# Patient Record
Sex: FEMALE | Race: WHITE | Employment: FULL TIME | URBAN - METROPOLITAN AREA
[De-identification: names, ages, dates, MRNs, and addresses within clinical notes are randomized per-mention and may not be internally consistent; named-entity substitution may affect disease eponyms.]

---

## 2017-12-28 ENCOUNTER — HOSPITAL ENCOUNTER (OUTPATIENT)
Age: 52
Discharge: HOME | End: 2017-12-28
Payer: OTHER GOVERNMENT

## 2017-12-28 ENCOUNTER — OFFICE VISIT (OUTPATIENT)
Dept: FAMILY MEDICINE CLINIC | Age: 52
End: 2017-12-28
Payer: OTHER GOVERNMENT

## 2017-12-28 ENCOUNTER — HOSPITAL ENCOUNTER (OUTPATIENT)
Dept: GENERAL RADIOLOGY | Age: 52
Discharge: HOME | End: 2017-12-28
Payer: OTHER GOVERNMENT

## 2017-12-28 VITALS
RESPIRATION RATE: 16 BRPM | DIASTOLIC BLOOD PRESSURE: 88 MMHG | BODY MASS INDEX: 43.52 KG/M2 | WEIGHT: 282 LBS | TEMPERATURE: 97.8 F | HEART RATE: 73 BPM | SYSTOLIC BLOOD PRESSURE: 160 MMHG

## 2017-12-28 DIAGNOSIS — R03.0 ELEVATED BLOOD PRESSURE READING: ICD-10-CM

## 2017-12-28 DIAGNOSIS — Z12.31 VISIT FOR SCREENING MAMMOGRAM: ICD-10-CM

## 2017-12-28 DIAGNOSIS — M25.512 ACUTE PAIN OF LEFT SHOULDER: ICD-10-CM

## 2017-12-28 DIAGNOSIS — M25.512 ACUTE PAIN OF LEFT SHOULDER: Primary | ICD-10-CM

## 2017-12-28 LAB
EKG ATRIAL RATE: 66 BPM
EKG P AXIS: 59 DEGREES
EKG P-R INTERVAL: 174 MS
EKG Q-T INTERVAL: 454 MS
EKG QRS DURATION: 136 MS
EKG QTC CALCULATION (BAZETT): 475 MS
EKG R AXIS: 43 DEGREES
EKG T AXIS: 3 DEGREES
EKG VENTRICULAR RATE: 66 BPM

## 2017-12-28 PROCEDURE — 99213 OFFICE O/P EST LOW 20 MIN: CPT | Performed by: NURSE PRACTITIONER

## 2017-12-28 PROCEDURE — 3014F SCREEN MAMMO DOC REV: CPT | Performed by: NURSE PRACTITIONER

## 2017-12-28 PROCEDURE — 4004F PT TOBACCO SCREEN RCVD TLK: CPT | Performed by: NURSE PRACTITIONER

## 2017-12-28 PROCEDURE — 3017F COLORECTAL CA SCREEN DOC REV: CPT | Performed by: NURSE PRACTITIONER

## 2017-12-28 PROCEDURE — G8484 FLU IMMUNIZE NO ADMIN: HCPCS | Performed by: NURSE PRACTITIONER

## 2017-12-28 PROCEDURE — G8417 CALC BMI ABV UP PARAM F/U: HCPCS | Performed by: NURSE PRACTITIONER

## 2017-12-28 PROCEDURE — G8427 DOCREV CUR MEDS BY ELIG CLIN: HCPCS | Performed by: NURSE PRACTITIONER

## 2017-12-28 PROCEDURE — 93005 ELECTROCARDIOGRAM TRACING: CPT

## 2017-12-28 PROCEDURE — 73030 X-RAY EXAM OF SHOULDER: CPT

## 2017-12-28 RX ORDER — METAXALONE 800 MG/1
800 TABLET ORAL 3 TIMES DAILY
Qty: 30 TABLET | Refills: 0 | Status: SHIPPED | OUTPATIENT
Start: 2017-12-28 | End: 2018-01-08 | Stop reason: SDUPTHER

## 2017-12-28 RX ORDER — PREDNISONE 20 MG/1
40 TABLET ORAL DAILY
Qty: 10 TABLET | Refills: 0 | Status: SHIPPED | OUTPATIENT
Start: 2017-12-28 | End: 2018-01-02

## 2017-12-28 RX ORDER — HYDROCODONE BITARTRATE AND ACETAMINOPHEN 5; 325 MG/1; MG/1
1 TABLET ORAL EVERY 6 HOURS PRN
Qty: 10 TABLET | Refills: 0 | Status: SHIPPED | OUTPATIENT
Start: 2017-12-28 | End: 2018-01-04

## 2017-12-28 ASSESSMENT — ENCOUNTER SYMPTOMS
SHORTNESS OF BREATH: 0
NAUSEA: 0
WHEEZING: 0
VOMITING: 0
COLOR CHANGE: 0

## 2017-12-28 NOTE — PROGRESS NOTES
[address]  Kimberli Covarrubias [address]  Dept: [phone]  Dept Fax: [phone]    Lorenzo Khan is a 45 y.o. female who presents today for her medical conditions/complaints as noted below. Lorenzo Khan is c/o of Shoulder Pain (Lt shoulder pain x 2 weeks pt states pain rates at 8/9)        HPI:     Shoulder Pain    The pain is present in the left shoulder. This is a new problem. Episode onset: 2 weeks. There has been no history of extremity trauma. The problem occurs constantly. The problem has been gradually worsening. The quality of the pain is described as aching and sharp. The pain is at a severity of 8/10. The pain is moderate. Associated symptoms include a limited range of motion. Pertinent negatives include no fever, inability to bear weight, itching, joint locking, joint swelling, numbness, stiffness or tingling. The symptoms are aggravated by activity and lying down (certain positions when ,moves arm fowards). She has tried NSAIDS for the symptoms. The treatment provided mild relief. Family history does not include gout or rheumatoid arthritis. There is no history of gout. Her blood pressure is elevated today though she is in significant pain. She states she had her blood pressure checked a few months ago is within normal range. Denies any history of hypertension. Denies chest pain or pressure, shortness of breath, headaches vision changes.   Does smoke tobacco.  She's had a history of palpitations the past.  Past Medical History:   Diagnosis Date    Abnormal menses     Varicose veins       Past Surgical History:   Procedure Laterality Date    APPENDECTOMY  1967    BREAST LUMPECTOMY Left 1977   [facility]    HYSTERECTOMY  7-20-15    Dr. Christy Kramer, [facility]    TONSILLECTOMY AND ADENOIDECTOMY Bilateral 1969       Family History   Problem Relation Age of Onset    High Blood Pressure Mother    [facility] Neuropathy Mother     Breast Cancer Mother     Heart Disease Brother        Social History   Substance Use Topics    Smoking status: Current Every Day Smoker     Packs/day: 0.50     Years: 30.00     Types: Cigarettes    Smokeless tobacco: Never Used    Alcohol use No      Current Outpatient Prescriptions   Medication Sig Dispense Refill    metaxalone (SKELAXIN) 800 MG tablet Take 1 tablet by mouth 3 times daily for 10 days 30 tablet 0    HYDROcodone-acetaminophen (NORCO) 5-325 MG per tablet Take 1 tablet by mouth every 6 hours as needed for Pain . 10 tablet 0    predniSONE (DELTASONE) 20 MG tablet Take 2 tablets by mouth daily for 5 days 10 tablet 0    docusate sodium (COLACE) 100 MG capsule   5    ibuprofen (ADVIL;MOTRIN) 200 MG tablet Take 200 mg by mouth every 6 hours as needed for Pain      acetaminophen (TYLENOL) 325 MG tablet Take 650 mg by mouth every 6 hours as needed for Pain      Multiple Vitamins-Minerals (MULTIVITAMIN PO) Take by mouth      ferrous sulfate 325 (65 FE) MG tablet Take 325 mg by mouth daily (with breakfast)      Omega-3 Fatty Acids (FISH OIL) 1200 MG CAPS Take 1 tablet by mouth daily      calcium citrate-vitamin D (CITRICAL + D) 315-250 MG-UNIT TABS Take by mouth       No current facility-administered medications for this visit. Allergies   Allergen Reactions    Pcn [Penicillins] Other (See Comments)     childhood       Health Maintenance   Topic Date Due    Hepatitis C screen  1965    HIV screen  10/29/1978    DTaP/Tdap/Td vaccine (1 - Tdap) 10/29/1982    Pneumococcal med risk (1 of 1 - PPSV23) 10/29/1982    Cervical cancer screen  10/29/1984    Colon cancer screen colonoscopy  10/29/2013    Breast cancer screen  06/17/2016    Flu vaccine (1) 09/01/2017    Lipid screen  07/01/2020       Subjective:      Review of Systems   Constitutional: Negative for chills, fatigue, fever and unexpected weight change. Eyes: Negative for visual disturbance. HYDROcodone-acetaminophen (NORCO) 5-325 MG per tablet    predniSONE (DELTASONE) 20 MG tablet    EKG 12 Lead    [facility] - [place]   2. Elevated blood pressure reading  EKG 12 Lead   3. Visit for screening mammogram  ELSA Screening Bilateral       Plan:      [facility] presents today for an acute visit in regards to shoulder pain is mainly located posterior worse with certain movements of her shoulder or when she lays down. She has had no evaluation thus far. 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 Shoulder x-ray has been ordered. Short-term course of oral steroids informed take this with food and no other NSAIDs muscle relaxant and small supply of Norco provided. Informed take all medications as directed informed of the adverse effects associated with these medications. Have encouraged physical therapy 2 to 3 times a week for the next 4-6 weeks. Also provided her work note for the next 3 days. If her symptoms. Persist or worsen with suggest referral to orthopedics    Her blood pressure was elevated today she's isn't manic with this she is in a lot of discomfort suspect related to pain will check EKG though given shoulder pain and hypertension. She has not been in for years in regards to her chronic issues of asked her to follow-up in 4-6 weeks for her shoulder pain at that time also for a regular office visit checkup. She was provided fit test for colon cancer screening and mammogram for breast cancer screening while in office. Return in about 4 weeks (around 1/25/2018) for chronic issues and shoudler pain.     Orders Placed This Encounter   Procedures    XR SHOULDER LEFT (MIN 2 VIEWS)     Standing Status:   Future     Standing Expiration Date:   12/28/2018     Order Specific Question:   Reason for exam:     Answer:   pain    ELSA Screening Bilateral     Standing Status:   Future     Standing Expiration Date: 1/17/2019     Order Specific Question:   Reason for exam:     Answer:   screening    [facility] [place]     Referral Priority:   Routine     Referral Type:   Eval and Treat     Referral Reason:   Specialty Services Required     Requested Specialty:   Physical Therapy     Number of Visits Requested:   1    EKG 12 Lead     Order Specific Question:   Reason for Exam?     Answer:   Hypertension     Comments:   left shoulder pain     Orders Placed This Encounter   Medications    metaxalone (SKELAXIN) 800 MG tablet     Sig: Take 1 tablet by mouth 3 times daily for 10 days     Dispense:  30 tablet     Refill:  0    HYDROcodone-acetaminophen (NORCO) 5-325 MG per tablet     Sig: Take 1 tablet by mouth every 6 hours as needed for Pain . Dispense:  10 tablet     Refill:  0    predniSONE (DELTASONE) 20 MG tablet     Sig: Take 2 tablets by mouth daily for 5 days     Dispense:  10 tablet     Refill:  0       Patient given educational materials - see patient instructions. Discussed use, benefit, and side effects of prescribed medications. All patient questions answered. Pt voiced understanding. Reviewed health maintenance. Instructed to continue current medications, diet and exercise. Patient agreed with treatment plan. Follow up as directed. Electronically signed by Yumiko Portillo CNP on 12/28/2017 at 1:33 PM    Of the 15 minute duration appointment visit, Yumiko Portillo CNP spent at least 50% of the face-to-face time in counseling, explanation of diagnosis, planning of further management, and answering all questions.

## 2017-12-28 NOTE — LETTER
[facility]  [address]  Phone: [phone]  Fax: [phone]    Sebastian Schwartz CNP        December 28, 2017     Patient: Caty Beck   YOB: 1965   Date of Visit: 12/28/2017       To Whom it May Concern:    Pedro Pastor was seen in my clinic on 12/28/2017. She may return to work on 1/2/2018. If you have any questions or concerns, please don't hesitate to call.     Sincerely,         Sebastian Schwartz CNP

## 2017-12-28 NOTE — PATIENT INSTRUCTIONS
be used for purposes not listed in this medication guide. What should I discuss with my healthcare provider before taking metaxalone? You should not use metaxalone if you are allergic to it, or if you have:  · anemia (low red blood cells);  · severe kidney disease; or  · severe liver disease. To make sure metaxalone is safe for you, tell your doctor if you have liver or kidney disease. It is not known whether this medicine will harm an unborn baby. Tell your doctor if you are pregnant or plan to become pregnant. It is not known whether metaxalone passes into breast milk or if it could harm a nursing baby. You should not breast-feed while using this medicine. Metaxalone is not approved for use by anyone younger than 15years old. How should I take metaxalone? Follow all directions on your prescription label. Do not take this medicine in larger or smaller amounts or for longer than recommended. This medicine can cause unusual results with certain medical tests. Tell any doctor who treats you that you are using metaxalone. Metaxalone is only part of a complete treatment program that may also include rest, physical therapy, or other pain relief measures. Follow your doctor's instructions. Store at room temperature away from moisture and heat. What happens if I miss a dose? Take the missed dose as soon as you remember. Skip the missed dose if it is almost time for your next scheduled dose. Do not take extra medicine to make up the missed dose. What happens if I overdose? Seek emergency medical attention or call the Poison Help line at 1-859.571.2725. An overdose of metaxalone can be fatal.  What should I avoid while taking metaxalone? Do not drink alcohol. Dangerous side effects or death can occur when alcohol is combined with metaxalone. Check your food and medicine labels to be sure these products do not contain alcohol. This medicine may impair your thinking or reactions.  Be careful if you drive or do called serotonin syndrome. Be sure your doctor knows if you also take medicine for depression, mental illness, Parkinson's disease, migraine headaches, serious infections, or prevention of nausea and vomiting. Ask your doctor before making any changes in how or when you take your medications. To make sure this medicine is safe for you, tell your doctor if you have:  · liver disease, cirrhosis, or if you drink more than 3 alcoholic beverages per day;  · a history of alcoholism or drug addiction;  · diarrhea, inflammatory bowel disease, bowel obstruction, severe constipation;  · kidney disease;  · low blood pressure, or if you are dehydrated;  · a history of head injury, brain tumor, or stroke;  · asthma, COPD, sleep apnea, or other breathing disorders; or  · if you use a sedative like Valium (diazepam, alprazolam, lorazepam, Ativan, Klonopin, Restoril, Tranxene, Versed, Xanax, and others). This medicine is more likely to cause breathing problems in older adults and people who are severely ill, malnourished, or otherwise debilitated. If you use narcotic medicine while you are pregnant, your baby could become dependent on the drug. This can cause life-threatening withdrawal symptoms in the baby after it is born. Babies born dependent on habit-forming medicine may need medical treatment for several weeks. Tell your doctor if you are pregnant or plan to become pregnant. Acetaminophen and hydrocodone can pass into breast milk and may harm a nursing baby. You should not breast-feed while using this medicine. How should I take acetaminophen and hydrocodone? Follow all directions on your prescription label. Never take this medicine in larger amounts, or for longer than prescribed. An overdose can damage your liver or cause death. Tell your doctor if the medicine seems to stop working as well in relieving your pain. Hydrocodone may be habit-forming, even at regular doses.  Never share this medicine with another person, especially someone with a history of drug abuse or addiction. MISUSE OF NARCOTIC MEDICINE CAN CAUSE ADDICTION, OVERDOSE, OR DEATH, especially in a child or other person using the medicine without a prescription. Selling or giving away acetaminophen and hydrocodone is against the law. Measure liquid medicine with the dosing syringe provided, or with a special dose-measuring spoon or medicine cup. If you do not have a dose-measuring device, ask your pharmacist for one. If you need surgery or medical tests, tell the doctor ahead of time that you are using this medicine. You may need to stop using the medicine for a short time. Do not stop using this medicine suddenly after long-term use, or you could have unpleasant withdrawal symptoms. Ask your doctor how to safely stop using acetaminophen and hydrocodone. Store at room temperature away from moisture and heat. Keep track of the amount of medicine used from each new bottle. Hydrocodone is a drug of abuse and you should be aware if anyone is using your medicine improperly or without a prescription. Always check your bottle to make sure you have received the correct pills (same brand and type) of medicine prescribed by your doctor. What happens if I miss a dose? Since acetaminophen and hydrocodone is taken as needed, you may not be on a dosing schedule. If you are taking the medication regularly, take the missed dose as soon as you remember. Skip the missed dose if it is almost time for your next scheduled dose. Do not use extra medicine to make up the missed dose. What happens if I overdose? Seek emergency medical attention or call the Poison Help line at 1-577.836.5114. An overdose of acetaminophen and hydrocodone can be fatal.   The first signs of an acetaminophen overdose include loss of appetite, nausea, vomiting, stomach pain, sweating, and confusion or weakness.  Later symptoms may include pain in your upper stomach, dark urine, and yellowing of arms.  ¨ Dizziness or lightheadedness. ¨ A fast or uneven pulse. After calling 911, chew 1 adult-strength aspirin. Wait for an ambulance. Do not try to drive yourself. ? · Your arm or hand is cool or pale or changes color. ?Call your doctor now or seek immediate medical care if:  ? · You have signs of infection, such as:  ¨ Increased pain, swelling, warmth, or redness in your shoulder. ¨ Red streaks leading from a place on your shoulder. ¨ Pus draining from an area of your shoulder. ¨ Swollen lymph nodes in your neck, armpits, or groin. ¨ A fever. ? Watch closely for changes in your health, and be sure to contact your doctor if:  ? · You cannot use your shoulder. ? · Your shoulder does not get better as expected. Where can you learn more? Go to https://Muzookape"[employer].ugichem. org and sign in to your [employer] account. Enter B839 in the Advizzer box to learn more about \"Shoulder Pain: Care Instructions. \"     If you do not have an account, please click on the \"Sign Up Now\" link. Current as of: March 21, 2017  Content Version: 11.4  © 2475-5625 [employer], [employer]. Care instructions adapted under license by [facility] ([facility]). If you have questions about a medical condition or this instruction, always ask your healthcare professional. [address] any warranty or liability for your use of this information.

## 2017-12-29 ENCOUNTER — HOSPITAL ENCOUNTER (OUTPATIENT)
Dept: PHYSICAL THERAPY | Facility: CLINIC | Age: 52
Setting detail: THERAPIES SERIES
Discharge: HOME | End: 2017-12-29
Payer: OTHER GOVERNMENT

## 2017-12-29 PROCEDURE — 97110 THERAPEUTIC EXERCISES: CPT

## 2017-12-29 PROCEDURE — 97161 PT EVAL LOW COMPLEX 20 MIN: CPT

## 2017-12-29 PROCEDURE — 97140 MANUAL THERAPY 1/> REGIONS: CPT

## 2017-12-29 NOTE — CONSULTS
worst 9-10/0  Pain altered Tx:  [] Yes  [] No  Action:  Symptoms:  [] Improving [] Worsening [x] Same  Better:  [] AM    [] PM    [] Sit    [] Rise/Sit    []Stand    [] Walk    [] Lying    [] Other:  Worse: [] AM    [] PM    [] Sit    [] Rise/Sit    []Stand    [] Walk    [] Lying    [] Bend                             [] Valsalva    [] Other:  Sleep: [] OK    [x] Disturbed    Objective:     ROM  °A/P STRENGTH    Left Right Left Right   Shoulder Flex wfl wfl 4- 4-   Ext wnl wnl 5 5   ABD wfl wfl 4- 4-   ER @ 0 45 90 wnl wnl 5 5   IR wnl wnl 5 5   Elbow Flex. 5    Elbow Ext.   4 4+   Wrist Flex. 5    Wrist Ext.   5    Rad. Dev.   5    Ulnar Dev.   5           C spine: tight with flexion, ext, side bending, rotation wnl      OBSERVATION No Deficit Deficit Not Tested Comments   Forward Head [] [x] []    Rounded Shoulders [] [x] []    Kyphosis [] [x] []    Scap Height/Position [] [x] []    Winging [] [] []    SH Rhythm [] [] []    Comments:    Assessment:presents with L pec minor, MT/rhomboid and LT spasm  Problems:    [x] ? Pain:  [] ? ROM:  [x] ? Strength: with ulices shoulders  [x] ? Function:as she is off work at this time. [] Other:    STG: (to be met in 6 treatments)  1. ? Pain: <3/10  2. ? ROM: to wnl with shoulder flexion and abd  3. ? Strength: 5/5 with L UE movements  4. ? Function: Able to return to work next week to normal duty  5. Independent with Home Exercise Programs    LTG: (to be met in 12 treatments)  1. 0-1/10 pain in L shoulder/scap/neck region  2. No pain with standing and leaning forward.                  Patient goals:\"to feel better\"    Rehab Potential:  [x] Good  [] Fair  [] Poor   Suggested Professional Referral:  [x] No  [] Yes:  Barriers to Goal Achievement[de-identified]  [x] No  [] Yes:  Domestic Concerns:  [x] No  [] Yes:    Pt. Education:  [x] Plans/Goals, Risks/Benefits discussed  [] Home exercise program  Method of Education: [x] Verbal  [] Demo  [] Written  Comprehension of Education:  [x]

## 2018-01-03 ENCOUNTER — HOSPITAL ENCOUNTER (OUTPATIENT)
Dept: PHYSICAL THERAPY | Facility: CLINIC | Age: 53
Setting detail: THERAPIES SERIES
Discharge: HOME | End: 2018-01-03
Payer: OTHER GOVERNMENT

## 2018-01-03 PROCEDURE — 97110 THERAPEUTIC EXERCISES: CPT

## 2018-01-03 NOTE — FLOWSHEET NOTE
[] [facility]       Outpatient Physical        Therapy       [address]       Phone: [phone]       Fax: [phone] [] [facility] Promotion at [address] [facility]       Phone: [phone]       Fax: [phone] [] [place]. [address] [place] [address] Health Promotion  [address] [place] [address]   Phone: [phone]   Fax:  [phone]     Physical Therapy Daily Treatment Note    Date:  1/3/2018  Patient Name:  Lea Leggett    :  1965  MRN: [id number]  Physician: Laura Hargrove CNP    Insurance: Kerry Mood (60 visits/yr)  Medical Diagnosis: Acute pain of L shoulderRehab Codes: E72.915,   Onset Date: 17                         Next Dr. Talavera Rowland / total visits:   Cancels/No Shows: 0    Subjective:    Pain:  [x] Yes  [] No Location: L upper trap/cervical   Pain Rating: (0-10 scale) 6/7/10  Pain altered Tx:  [] No  [] Yes  Action:  Comments: Pt mentioned that after last session she had cramping and tingling down her L UE. Pt mentioned more painful with night time. Objective:  Modalities:   Precautions:  Exercises:  Exercise Reps/ Time Weight/ Level Comments   UBE 10'  Warm-up   Supine          1/2 noodle 5'   Length of spine   1/2 noodle  5'   Laterally across T4   Seated          Upper trap stretch 10x10\"       Mid thoracic stretch 10x10\"   3 way 45°,90°,    Posterior capsule stretch 10x10\"     Shrug to retro rotation 15x     Sidelying         Ext rot 15x       Standing         Doorway stretch 10x10\"           Other:    Specific Instructions for next treatment:    Treatment Charges: Mins Units   []  Modalities     [x]  Ther Exercise 45 3   []  Manual Therapy     []  Ther Activities     []  Aquatics     []  Vasocompression     []  Other     Total Treatment time          Assessment: [x] Progressing toward goals. [] No change.      [x] Other: Added more stretches to pt program, educated pt on stretches and body mechanics that

## 2018-01-05 ENCOUNTER — HOSPITAL ENCOUNTER (OUTPATIENT)
Dept: PHYSICAL THERAPY | Facility: CLINIC | Age: 53
Setting detail: THERAPIES SERIES
Discharge: HOME | End: 2018-01-05
Payer: OTHER GOVERNMENT

## 2018-01-05 PROCEDURE — 97110 THERAPEUTIC EXERCISES: CPT

## 2018-01-05 NOTE — FLOWSHEET NOTE
[] [facility]       Outpatient Physical        Therapy       [address]       Phone: [phone]       Fax: [phone] [] [facility] Promotion at [address]       Phone: [phone]       Fax: [phone] [x] [place]. [facility] Health Promotion  [address] [place] [address]   Phone: [phone]   Fax:  [phone]     Physical Therapy Daily Treatment Note    Date:  2018  Patient Name:  Iván Clemens    :  1965  MRN: [id number]  Physician: Regine Kelsey CNP  Insurance: Latricia Anguiano (61 visits/yr)  Medical Diagnosis: Acute pain of L shoulderRehab Codes: R33.586,   Onset Date: 17                          Next Dr. Shamar Gaytan / total visits: 312   Cancels/No Shows: 0    Subjective:    Pain:  [x] Yes  [] No Location: L upper trap/cervical   Pain Rating: (0-10 scale) 5-6/10  Pain altered Tx:  [x] No  [] Yes  Action:  Comments: Pt requests no DI as it left her L arm very painful and with limited function after init eval.  She states that she was short on staff at work and had to work more. She declined some activities at work due to fear of reinjury. It was explained to her that she can modify her technique to safely perform the task rather than declining or performing it with poor posture. She consistently demonstrated scapular elevation with simulation of holding mail in L hand or lifting upward. She was advised not to shrug her shoulder to lift.      Objective:  Modalities: none  Precautions: standard  Exercises:  Exercise Reps/ Time Weight/ Level Comments   UBE 10'  Warm-up   Supine          1/2 noodle 5'  x Length of spine   1/2 noodle  3'  x Laterally across T4   Prone      I-T-Y 15x ea 1 lb     row 15 3 lbs    Seated          Upper trap stretch 3x30\"  x Gentle    Shrug to retro rotation 20x x    Sidelying         Ext rot 2x20 1 lb     Standing         Doorway stretch 3x30\" x    TB ER 3x10 red

## 2018-01-08 DIAGNOSIS — M25.512 ACUTE PAIN OF LEFT SHOULDER: ICD-10-CM

## 2018-01-08 RX ORDER — METAXALONE 800 MG/1
800 TABLET ORAL 3 TIMES DAILY
Qty: 30 TABLET | Refills: 0 | Status: SHIPPED | OUTPATIENT
Start: 2018-01-08 | End: 2018-01-17 | Stop reason: SDUPTHER

## 2018-01-08 NOTE — TELEPHONE ENCOUNTER
From: Akash Jones  Sent: 1/7/2018 3:04 PM EST  Subject: Medication Renewal Request    Leno Chilel.  Ana Laura Cooper would like a refill of the following medications:  metaxalone (SKELAXIN) 800 MG tablet Radha Bejarano CNP]    Preferred pharmacy: [facility] [address] [address] - P [phone] - F [phone]    Comment:  Still hurting this seems to take the edge off some

## 2018-01-10 ENCOUNTER — HOSPITAL ENCOUNTER (OUTPATIENT)
Dept: PHYSICAL THERAPY | Facility: CLINIC | Age: 53
Setting detail: THERAPIES SERIES
Discharge: HOME | End: 2018-01-10
Payer: OTHER GOVERNMENT

## 2018-01-10 PROCEDURE — 97110 THERAPEUTIC EXERCISES: CPT

## 2018-01-12 ENCOUNTER — HOSPITAL ENCOUNTER (OUTPATIENT)
Dept: PHYSICAL THERAPY | Facility: CLINIC | Age: 53
Setting detail: THERAPIES SERIES
Discharge: HOME | End: 2018-01-12
Payer: OTHER GOVERNMENT

## 2018-01-12 NOTE — FLOWSHEET NOTE
[] [facility]        Outpatient Physical                Therapy       [address]       Phone: [phone]       Fax: [phone] [] [facility] at [address] [facility]       Phone: [phone]       Fax: [phone] [x] [place].  [address] [place] [address]      Phone: [phone]      Fax:  [phone]     Physical Therapy Cancel/No Show note    Date: 2018  Patient: Constantine Bryson  : 1965  MRN: [id number]    Cancels/No Shows to date:   For today's appointment patient:  [x]  Cancelled  []  Rescheduled appointment  []  No-show     Reason given by patient:  []  Patient ill  []  Conflicting appointment  []  No transportation    []  Conflict with work  []  No reason given  [x]  Weather related  []  Other:      Comments:      [x]  Next appointment was confirmed    Electronically signed by: Henrik Zhou

## 2018-01-15 ENCOUNTER — APPOINTMENT (OUTPATIENT)
Dept: PHYSICAL THERAPY | Facility: CLINIC | Age: 53
End: 2018-01-15
Payer: OTHER GOVERNMENT

## 2018-01-17 ENCOUNTER — HOSPITAL ENCOUNTER (OUTPATIENT)
Dept: PHYSICAL THERAPY | Facility: CLINIC | Age: 53
Setting detail: THERAPIES SERIES
Discharge: HOME | End: 2018-01-17
Payer: OTHER GOVERNMENT

## 2018-01-17 ENCOUNTER — HOSPITAL ENCOUNTER (OUTPATIENT)
Dept: MAMMOGRAPHY | Age: 53
Discharge: HOME | End: 2018-01-17
Payer: OTHER GOVERNMENT

## 2018-01-17 ENCOUNTER — TELEPHONE (OUTPATIENT)
Dept: FAMILY MEDICINE CLINIC | Age: 53
End: 2018-01-17

## 2018-01-17 DIAGNOSIS — Z12.31 VISIT FOR SCREENING MAMMOGRAM: ICD-10-CM

## 2018-01-17 DIAGNOSIS — M25.512 ACUTE PAIN OF LEFT SHOULDER: ICD-10-CM

## 2018-01-17 PROCEDURE — 77067 SCR MAMMO BI INCL CAD: CPT

## 2018-01-17 PROCEDURE — 97110 THERAPEUTIC EXERCISES: CPT

## 2018-01-17 RX ORDER — METAXALONE 800 MG/1
800 TABLET ORAL 3 TIMES DAILY
Qty: 30 TABLET | Refills: 1 | Status: SHIPPED | OUTPATIENT
Start: 2018-01-17 | End: 2018-01-31 | Stop reason: SDUPTHER

## 2018-01-18 ENCOUNTER — TELEPHONE (OUTPATIENT)
Dept: FAMILY MEDICINE CLINIC | Age: 53
End: 2018-01-18

## 2018-01-19 ENCOUNTER — HOSPITAL ENCOUNTER (OUTPATIENT)
Dept: PHYSICAL THERAPY | Facility: CLINIC | Age: 53
Setting detail: THERAPIES SERIES
Discharge: HOME | End: 2018-01-19
Payer: OTHER GOVERNMENT

## 2018-01-19 PROCEDURE — 97110 THERAPEUTIC EXERCISES: CPT

## 2018-01-24 ENCOUNTER — HOSPITAL ENCOUNTER (OUTPATIENT)
Dept: PHYSICAL THERAPY | Facility: CLINIC | Age: 53
Setting detail: THERAPIES SERIES
Discharge: HOME | End: 2018-01-24
Payer: OTHER GOVERNMENT

## 2018-01-24 PROCEDURE — 97110 THERAPEUTIC EXERCISES: CPT

## 2018-01-26 ENCOUNTER — APPOINTMENT (OUTPATIENT)
Dept: PHYSICAL THERAPY | Facility: CLINIC | Age: 53
End: 2018-01-26
Payer: OTHER GOVERNMENT

## 2018-01-31 ENCOUNTER — OFFICE VISIT (OUTPATIENT)
Dept: FAMILY MEDICINE CLINIC | Age: 53
End: 2018-01-31
Payer: OTHER GOVERNMENT

## 2018-01-31 VITALS
RESPIRATION RATE: 16 BRPM | WEIGHT: 280 LBS | BODY MASS INDEX: 42.44 KG/M2 | HEIGHT: 68 IN | SYSTOLIC BLOOD PRESSURE: 164 MMHG | DIASTOLIC BLOOD PRESSURE: 93 MMHG | HEART RATE: 66 BPM

## 2018-01-31 DIAGNOSIS — M25.512 LEFT SHOULDER PAIN, UNSPECIFIED CHRONICITY: ICD-10-CM

## 2018-01-31 DIAGNOSIS — M54.12 CERVICAL RADICULOPATHY: ICD-10-CM

## 2018-01-31 DIAGNOSIS — Z13.220 SCREENING CHOLESTEROL LEVEL: ICD-10-CM

## 2018-01-31 DIAGNOSIS — M54.2 CERVICAL PAIN (NECK): ICD-10-CM

## 2018-01-31 DIAGNOSIS — M54.12 CERVICAL RADICULOPATHY: Primary | ICD-10-CM

## 2018-01-31 DIAGNOSIS — I10 ESSENTIAL HYPERTENSION: ICD-10-CM

## 2018-01-31 DIAGNOSIS — Z11.59 NEED FOR HEPATITIS C SCREENING TEST: ICD-10-CM

## 2018-01-31 PROCEDURE — 4004F PT TOBACCO SCREEN RCVD TLK: CPT | Performed by: NURSE PRACTITIONER

## 2018-01-31 PROCEDURE — 3017F COLORECTAL CA SCREEN DOC REV: CPT | Performed by: NURSE PRACTITIONER

## 2018-01-31 PROCEDURE — 99214 OFFICE O/P EST MOD 30 MIN: CPT | Performed by: NURSE PRACTITIONER

## 2018-01-31 PROCEDURE — G8484 FLU IMMUNIZE NO ADMIN: HCPCS | Performed by: NURSE PRACTITIONER

## 2018-01-31 PROCEDURE — G8417 CALC BMI ABV UP PARAM F/U: HCPCS | Performed by: NURSE PRACTITIONER

## 2018-01-31 PROCEDURE — G8427 DOCREV CUR MEDS BY ELIG CLIN: HCPCS | Performed by: NURSE PRACTITIONER

## 2018-01-31 PROCEDURE — 3014F SCREEN MAMMO DOC REV: CPT | Performed by: NURSE PRACTITIONER

## 2018-01-31 RX ORDER — LISINOPRIL 5 MG/1
5 TABLET ORAL DAILY
Qty: 30 TABLET | Refills: 3 | Status: SHIPPED | OUTPATIENT
Start: 2018-01-31 | End: 2018-02-01 | Stop reason: SDUPTHER

## 2018-01-31 RX ORDER — METAXALONE 800 MG/1
800 TABLET ORAL 3 TIMES DAILY
Qty: 30 TABLET | Refills: 1 | Status: SHIPPED | OUTPATIENT
Start: 2018-01-31 | End: 2018-01-31 | Stop reason: SDUPTHER

## 2018-01-31 RX ORDER — METAXALONE 800 MG/1
800 TABLET ORAL 3 TIMES DAILY
Qty: 30 TABLET | Refills: 1 | Status: SHIPPED | OUTPATIENT
Start: 2018-01-31 | End: 2018-02-10

## 2018-01-31 RX ORDER — LISINOPRIL 5 MG/1
5 TABLET ORAL DAILY
Qty: 30 TABLET | Refills: 3 | Status: SHIPPED | OUTPATIENT
Start: 2018-01-31 | End: 2018-01-31 | Stop reason: SDUPTHER

## 2018-01-31 RX ORDER — PREDNISONE 20 MG/1
40 TABLET ORAL DAILY
Qty: 10 TABLET | Refills: 0 | Status: SHIPPED | OUTPATIENT
Start: 2018-01-31 | End: 2018-01-31 | Stop reason: SDUPTHER

## 2018-01-31 RX ORDER — PREDNISONE 20 MG/1
40 TABLET ORAL DAILY
Qty: 10 TABLET | Refills: 0 | Status: SHIPPED | OUTPATIENT
Start: 2018-01-31 | End: 2018-02-05

## 2018-01-31 RX ORDER — METAXALONE 800 MG/1
TABLET ORAL
COMMUNITY
Start: 2018-01-28 | End: 2018-01-31 | Stop reason: SDUPTHER

## 2018-01-31 NOTE — TELEPHONE ENCOUNTER
Patient called in and states that prescriptions were sent to wrong pharmacy this morning after her visit. Medication pended along with correct pharmacy, please advise.

## 2018-01-31 NOTE — PATIENT INSTRUCTIONS
This can cause stomach upset or bleeding. · Follow your doctor's instructions for how to stop taking this medicine. You may need to taper it. This means the medicine should be slowly reduced. Do not stop taking the medicine all at once. When should you call for help? Call 911 if:  ? · You vomit blood or what looks like coffee grounds. ?Call your doctor now or seek immediate medical care if:  ? · Your symptoms are getting worse. ? · You are dizzy or lightheaded, or you feel like you may faint. ? · You have new or worse nausea or vomiting. ? · You have stomach pain that is getting worse. ? · Your stools are black. ? Watch closely for changes in your health, and be sure to contact your doctor if:  ? · You do not get better as expected. Where can you learn more? Go to https://[employer]Send[employer]. org and sign in to your [employer] account. Enter D118 in the [employer] box to learn more about \"Oral Corticosteroids: Care Instructions. \"     If you do not have an account, please click on the \"Sign Up Now\" link. Current as of: May 12, 2017  Content Version: 11.5  © 1885-7143 [employer]. Care instructions adapted under license by [facility] ([facility]). If you have questions about a medical condition or this instruction, always ask your healthcare professional. [place] [address] any warranty or liability for your use of this information. Pinched Nerve in the Neck: Care Instructions  Your Care Instructions  A pinched nerve in the neck happens when a vertebra or disc in the upper part of your spine is damaged. This damage can happen because of an injury. Or it can just happen with age. The changes caused by the damage may put pressure on a nearby nerve root, pinching it. This causes symptoms such as sharp pain in your neck, shoulder, arm, hand, or back. You may also have tingling or numbness. Sometimes it makes your arm weaker.  The symptoms are usually worse when you turn your head or strain your neck. For many people, the symptoms get better over time and finally go away. Early treatment usually includes medicines for pain and swelling. Sometimes physical therapy and special exercises may help. Follow-up care is a key part of your treatment and safety. Be sure to make and go to all appointments, and call your doctor if you are having problems. It's also a good idea to know your test results and keep a list of the medicines you take. How can you care for yourself at home? · Be safe with medicines. Read and follow all instructions on the label. ¨ If the doctor gave you a prescription medicine for pain, take it as prescribed. ¨ If you are not taking a prescription pain medicine, ask your doctor if you can take an over-the-counter medicine. · Try using a heating pad on a low or medium setting for 15 to 20 minutes every 2 or 3 hours. Try a warm shower in place of one session with the heating pad. You can also buy single-use heat wraps that last up to 8 hours. · You can also try an ice pack for 10 to 15 minutes every 2 to 3 hours. There isn't strong evidence that either heat or ice will help. But you can try them to see if they help you. · Don't spend too long in one position. Take short breaks to move around and change positions. · Wear a seat belt and shoulder harness when you are in a car. · Sleep with a pillow under your head and neck that keeps your neck straight. · If you were given a neck brace (cervical collar) to limit neck motion, wear it as instructed for as many days as your doctor tells you to. Do not wear it longer than you were told to. Wearing a brace for too long can lead to neck stiffness and can weaken the neck muscles. · Follow your doctor's instructions for gentle neck-stretching exercises. · Do not smoke. Smoking can slow healing of your discs. If you need help quitting, talk to your doctor about stop-smoking programs and medicines.  These enzyme. Lisinopril is used to treat high blood pressure (hypertension) in adults and children who are at least 10years old. Lisinopril is also used to treat congestive heart failure in adults, or to improve survival after a heart attack. Lisinopril may also be used for purposes not listed in this medication guide. What should I discuss with my healthcare provider before taking lisinopril? You should not use this medication if you are allergic to lisinopril or to any other ACE inhibitor, such as benazepril captopril, fosinopril, enalapril, moexipril, perindopril, quinapril, ramipril, or trandolapril. If you have diabetes, do not use lisinopril together with any medication that contains aliskiren ([place], [place], [place]). You may also need to avoid taking lisinopril with aliskiren if you have kidney disease. You should not use lisinopril if you have hereditary angioedema. To make sure lisinopril is safe for you, tell your doctor if you have:  · kidney disease (or if you are on dialysis);  · liver disease;  · diabetes; or  · high levels of potassium in your blood. Do not use if you are pregnant. If you become pregnant, stop taking this medicine and tell your doctor right away. Lisinopril can cause injury or death to the unborn baby if you take the medicine during your second or third trimester. It is not known whether lisinopril passes into breast milk or if it could harm a nursing baby. You should not breast-feed while using this medicine. How should I take lisinopril? Follow all directions on your prescription label. Your doctor may occasionally change your dose to make sure you get the best results. Do not take this medicine in larger or smaller amounts or for longer than recommended. Drink plenty of water each day while you are taking this medicine. Lisinopril can be taken with or without food.   Measure liquid medicine with the dosing syringe provided, or with a special dose-measuring spoon or medicine cup. If you do not have a dose-measuring device, ask your pharmacist for one. Your blood pressure will need to be checked often, and you may need frequent blood tests. Call your doctor if you have ongoing vomiting or diarrhea, or if you are sweating more than usual. You can easily become dehydrated while taking this medicine. This can lead to very low blood pressure, electrolyte disorders, or kidney failure while you are taking lisinopril. If you need surgery, tell the surgeon ahead of time that you are using lisinopril. You may need to stop using the medicine for a short time. If you are being treated for high blood pressure, keep using this medicine even if you feel well. High blood pressure often has no symptoms. You may need to use blood pressure medicine for the rest of your life. Store at room temperature away from moisture and heat. Do not freeze the oral liquid. What happens if I miss a dose? Take the missed dose as soon as you remember. Skip the missed dose if it is almost time for your next scheduled dose. Do not take extra medicine to make up the missed dose. What happens if I overdose? Seek emergency medical attention or call the Poison Help line at 1-618.705.6926. What should I avoid while taking lisinopril? Drinking alcohol can further lower your blood pressure and may increase certain side effects of lisinopril. Avoid becoming overheated or dehydrated during exercise, in hot weather, or by not drinking enough fluids. Lisinopril can decrease sweating and you may be more prone to heat stroke. Do not use salt substitutes or potassium supplements while taking lisinopril, unless your doctor has told you to. Avoid getting up too fast from a sitting or lying position, or you may feel dizzy. Get up slowly and steady yourself to prevent a fall. What are the possible side effects of lisinopril?   Get emergency medical help if you have signs of an allergic reaction: hives; severe stomach been made to ensure that the information provided by [address] is accurate, up-to-date, and complete, but no guarantee is made to that effect. Drug information contained herein may be time sensitive. [facility] information has been compiled for use by healthcare practitioners and consumers in the [place] and therefore [facility] does not warrant that uses outside of the [place] are appropriate, unless specifically indicated otherwise. [facility]'s drug information does not endorse drugs, diagnose patients or recommend therapy. [facility]'s drug information is an informational resource designed to assist licensed healthcare practitioners in caring for their patients and/or to serve consumers viewing this service as a supplement to, and not a substitute for, the expertise, skill, knowledge and judgment of healthcare practitioners. The absence of a warning for a given drug or drug combination in no way should be construed to indicate that the drug or drug combination is safe, effective or appropriate for any given patient. [facility] does not assume any responsibility for any aspect of healthcare administered with the aid of information [facility] provides. The information contained herein is not intended to cover all possible uses, directions, precautions, warnings, drug interactions, allergic reactions, or adverse effects. If you have questions about the drugs you are taking, check with your doctor, nurse or pharmacist.  Copyright 3804-1382 [address]: 13.04. Revision date: 9/19/2016. Care instructions adapted under license by [facility] ([facility]). If you have questions about a medical condition or this instruction, always ask your healthcare professional. [place] [address] any warranty or liability for your use of this information.

## 2018-01-31 NOTE — PROGRESS NOTES
Subjective:  Rizwan is in for continued evaluation and management follow-up in regards to neck and right shoulder pain and elevated blood pressure. Rizwan presents the office on December 28, 2017 with complaints of pain to her left shoulder area. This is located more to the posterior and trapezius region. It had been going on for 2 weeks prior to that she had been trying NSAID at home which have been provided mild relief. She describes her pain as severe as 8 out of 10. At that point she was having difficulty with complete range of motion of her shoulder but denied any fevers chills joint locking swelling numbness or tingling to her extremity. A left shoulder x-ray was ordered, ekg,and physical therapy. EKG was stable. She completed a left shoulder x-ray which is negative. She's been doing physical therapy although she has now developed more pain to her left side of her cervical area and still into trapezius region with pain now reading down her left arm with tingling at times to all of her fingers. She denies frequent headaches or vision changes. Denies specific neck injury. She states pain now seems to worsen when she turns her head to either side especially to right. She states the physical therapist office was more related to possibly a cervical issue now then actual shoulder. Her range of motion her shoulder has returned to normal.  If she did have a course of oral steroids and muscle relaxants and she has now been taking NSAIDs still without much relief. She is right-handed. She denies chest pain or pressure or shortness of breath. Denies bowel/bladder incontinence or saddle anesthesia. Her blood pressure was elevated also at the office visit although she wasn't discomfort at that time she is unsure if it is related more to this. She denies any chest pain or pressure shortness of breath headaches or vision changes. Review of systems per HPI, otherwise negative.     Allergies; medications; past medical, surgical, family, and social history; and problem list reviewed as indicated in this encounter. Objective:  Vitals: Blood pressure (!) 164/93, pulse 66, resp. rate 16, height 5' 7.52\" (1.715 m), weight 280 lb (127 kg), last menstrual period 06/11/2015, not currently breastfeeding. Constitutional: She is oriented to person, place, and time. She appears well-developed and well-nourished and in no acute distress. Cardiovascular: Normal rate and regular rhythm, no murmur, rub, or gallop    Pulmonary/Chest: Effort normal and breath sounds normal. No rales or wheezes. No chest retraction. Musculoskeletal: Neck- full ROM, No step-off no cervical spinal tenderness has left paraspinal tenderness and into left trapezius area. Extremities: no clubbing, cyanosis, or edema, still sensation and pulses intact muscle strength 5/5 bilaterally. Neurological:  CN II - XII grossly intact; no focal neurological deficits  Psychiatric:  Well groomed, well dressed. The patient maintains appropriate eye contact and does not appear to be responding to internal stimuli. No agitation    EXAMINATION:   4 VIEWS OF THE LEFT SHOULDER       12/28/2017 2:14 pm       COMPARISON:   None.       HISTORY:   ORDERING SYSTEM PROVIDED HISTORY: Acute pain of left shoulder   TECHNOLOGIST PROVIDED HISTORY:   Reason for exam:->pain   Ordering Physician Provided Reason for Exam: posterior left upper shoulder   pain for 2 weeks   Acuity: Acute   Type of Exam: Initial       FINDINGS:   Glenohumeral joint is normally aligned.  No evidence of acute fracture or   dislocation.  No abnormal periarticular calcifications.  The AC joint shows   no significant abnormality.       Visualized lung is unremarkable.           Impression   Negative left shoulder with no acute abnormality. Assessment/ Plan / Medical Decision Making  1. Cervical radiculopathy  predniSONE (DELTASONE) 20 MG tablet    MRI Cervical Spine WO Contrast   2. Cervical pain (neck)  predniSONE (DELTASONE) 20 MG tablet    MRI Cervical Spine WO Contrast   3. Left shoulder pain, unspecified chronicity     4. Essential hypertension  lisinopril (PRINIVIL;ZESTRIL) 5 MG tablet    Comprehensive Metabolic Panel   5. Screening cholesterol level  Lipid Panel   6. Need for hepatitis C screening test  Hepatitis C Antibody           Medications, laboratory testing, imaging, consultation, and follow up as documented in this encounter. She initially came in for some left shoulder discomfort although it appears her symptoms are more suggestive cervical radiculopathy in nature. At this point given she's tried physical therapy, NSAIDs and is still not having relief I suggested MRI of her cervical spine. We'll do a repeat of oral steroids no NSAIDs while taking this. Refilled Skelaxin. Discussed possibility of treatment with Neurontin. We'll let her know what the MRI results reveal. Notify us if symptoms worsen. High blood pressure- suspect HTN will start her on lisinopril 5mg daily informed to take as directed, she has BP cuff at home that she will monitor check metabolic panel. Check screening labs complete FIT test.  Refused pneumonia vaccine. States no longer has PAPs d/t total hysterectomy for non cancerous reasoning. Follow up 2 weeks for NV BP check, 1 months depending symptoms and result         Marnette Seip received counseling on the following healthy behaviors: medication adherence    Patient given educational materials on Cervical radiculopathy, prednisone, and lisinopril    Was a self-tracking handout given in paper form or via [employer]? No  If yes, see orders or list here. Discussed use, benefit, and side effects of prescribed medications. Barriers to medication compliance addressed. All patient questions answered. Pt voiced understanding.      This note is created with the assistance of a speech-recognition program.  While intending to generate a document that

## 2018-02-01 DIAGNOSIS — I10 ESSENTIAL HYPERTENSION: ICD-10-CM

## 2018-02-01 RX ORDER — LISINOPRIL 5 MG/1
5 TABLET ORAL DAILY
Qty: 90 TABLET | Refills: 1 | Status: SHIPPED | OUTPATIENT
Start: 2018-02-01

## 2018-02-10 ENCOUNTER — HOSPITAL ENCOUNTER (OUTPATIENT)
Dept: MRI IMAGING | Age: 53
Discharge: HOME | End: 2018-02-12
Payer: OTHER GOVERNMENT

## 2018-02-10 ENCOUNTER — HOSPITAL ENCOUNTER (OUTPATIENT)
Age: 53
Discharge: HOME | End: 2018-02-10
Payer: OTHER GOVERNMENT

## 2018-02-10 DIAGNOSIS — M54.2 CERVICAL PAIN (NECK): ICD-10-CM

## 2018-02-10 DIAGNOSIS — Z11.59 NEED FOR HEPATITIS C SCREENING TEST: ICD-10-CM

## 2018-02-10 DIAGNOSIS — I10 ESSENTIAL HYPERTENSION: ICD-10-CM

## 2018-02-10 DIAGNOSIS — Z13.220 SCREENING CHOLESTEROL LEVEL: ICD-10-CM

## 2018-02-10 DIAGNOSIS — M54.12 CERVICAL RADICULOPATHY: ICD-10-CM

## 2018-02-10 LAB
ALBUMIN SERPL-MCNC: 3.8 G/DL (ref 3.5–5.2)
ALBUMIN/GLOBULIN RATIO: 1.3 (ref 1–2.5)
ALP BLD-CCNC: 82 U/L (ref 35–104)
ALT SERPL-CCNC: 23 U/L (ref 5–33)
ANION GAP SERPL CALCULATED.3IONS-SCNC: 13 MMOL/L (ref 9–17)
AST SERPL-CCNC: 17 U/L
BILIRUB SERPL-MCNC: 0.39 MG/DL (ref 0.3–1.2)
BUN BLDV-MCNC: 24 MG/DL (ref 6–20)
BUN/CREAT BLD: ABNORMAL (ref 9–20)
CALCIUM SERPL-MCNC: 9.1 MG/DL (ref 8.6–10.4)
CHLORIDE BLD-SCNC: 107 MMOL/L (ref 98–107)
CHOLESTEROL/HDL RATIO: 3
CHOLESTEROL: 178 MG/DL
CO2: 28 MMOL/L (ref 20–31)
CREAT SERPL-MCNC: 0.47 MG/DL (ref 0.5–0.9)
GFR AFRICAN AMERICAN: >60 ML/MIN
GFR NON-AFRICAN AMERICAN: >60 ML/MIN
GFR SERPL CREATININE-BSD FRML MDRD: ABNORMAL ML/MIN/{1.73_M2}
GFR SERPL CREATININE-BSD FRML MDRD: ABNORMAL ML/MIN/{1.73_M2}
GLUCOSE BLD-MCNC: 90 MG/DL (ref 70–99)
HDLC SERPL-MCNC: 59 MG/DL
HEPATITIS C ANTIBODY: NONREACTIVE
LDL CHOLESTEROL: 104 MG/DL (ref 0–130)
POTASSIUM SERPL-SCNC: 4.9 MMOL/L (ref 3.7–5.3)
SODIUM BLD-SCNC: 148 MMOL/L (ref 135–144)
TOTAL PROTEIN: 6.8 G/DL (ref 6.4–8.3)
TRIGL SERPL-MCNC: 75 MG/DL
VLDLC SERPL CALC-MCNC: NORMAL MG/DL (ref 1–30)

## 2018-02-10 PROCEDURE — 36415 COLL VENOUS BLD VENIPUNCTURE: CPT

## 2018-02-10 PROCEDURE — 80061 LIPID PANEL: CPT

## 2018-02-10 PROCEDURE — 86803 HEPATITIS C AB TEST: CPT

## 2018-02-10 PROCEDURE — 72141 MRI NECK SPINE W/O DYE: CPT

## 2018-02-10 PROCEDURE — 80053 COMPREHEN METABOLIC PANEL: CPT

## 2018-02-13 PROBLEM — R79.89 PRERENAL AZOTEMIA: Status: ACTIVE | Noted: 2018-02-13

## 2018-02-13 PROBLEM — M50.20 PROTRUSION OF CERVICAL INTERVERTEBRAL DISC: Status: ACTIVE | Noted: 2018-02-13

## 2018-02-15 ENCOUNTER — PATIENT MESSAGE (OUTPATIENT)
Dept: FAMILY MEDICINE CLINIC | Age: 53
End: 2018-02-15

## 2018-02-15 DIAGNOSIS — R79.89 PRERENAL AZOTEMIA: ICD-10-CM

## 2018-02-15 DIAGNOSIS — M50.20 PROTRUSION OF CERVICAL INTERVERTEBRAL DISC: Primary | ICD-10-CM

## 2018-02-15 NOTE — TELEPHONE ENCOUNTER
From: Aries Leavitt  To: Zhanna Holloway CNP  Sent: 2/15/2018 8:05 AM EST  Subject: Visit Follow-Up Question    Keshav Wayne    Who would you recommend as a neurosurgeon? I guess you would know who is really good. Is there anything I should be aware of or make sure I don't do with my condition? I know they try to get me to do stuff at work that I shouldn't be doing. They may just forget. If you could get back with me I would appreciate it.     Thank you  Annetta Michaels

## 2018-02-15 NOTE — TELEPHONE ENCOUNTER
Lab order mailed to the patient. Referral faxed to Dr. Aydee Zaldivar office as requested. See media for confirmation.

## 2018-02-19 ENCOUNTER — NURSE ONLY (OUTPATIENT)
Dept: FAMILY MEDICINE CLINIC | Age: 53
End: 2018-02-19

## 2018-02-19 VITALS
BODY MASS INDEX: 44.04 KG/M2 | SYSTOLIC BLOOD PRESSURE: 137 MMHG | WEIGHT: 285.6 LBS | HEART RATE: 67 BPM | DIASTOLIC BLOOD PRESSURE: 78 MMHG | RESPIRATION RATE: 16 BRPM

## 2018-02-22 PROBLEM — M50.20 RUPTURED CERVICAL DISC: Status: ACTIVE | Noted: 2018-02-22

## 2018-02-22 PROBLEM — M50.20 PROTRUSION OF CERVICAL INTERVERTEBRAL DISC: Status: RESOLVED | Noted: 2018-02-13 | Resolved: 2018-02-22

## 2018-03-26 ENCOUNTER — HOSPITAL ENCOUNTER (OUTPATIENT)
Age: 53
Discharge: HOME | End: 2018-03-26
Payer: OTHER GOVERNMENT

## 2018-03-26 DIAGNOSIS — R79.89 PRERENAL AZOTEMIA: ICD-10-CM

## 2018-03-26 LAB
ANION GAP SERPL CALCULATED.3IONS-SCNC: 10 MMOL/L (ref 9–17)
BUN BLDV-MCNC: 20 MG/DL (ref 6–20)
BUN/CREAT BLD: ABNORMAL (ref 9–20)
CALCIUM SERPL-MCNC: 9.1 MG/DL (ref 8.6–10.4)
CHLORIDE BLD-SCNC: 101 MMOL/L (ref 98–107)
CO2: 27 MMOL/L (ref 20–31)
CREAT SERPL-MCNC: 0.4 MG/DL (ref 0.5–0.9)
GFR AFRICAN AMERICAN: >60 ML/MIN
GFR NON-AFRICAN AMERICAN: >60 ML/MIN
GFR SERPL CREATININE-BSD FRML MDRD: ABNORMAL ML/MIN/{1.73_M2}
GFR SERPL CREATININE-BSD FRML MDRD: ABNORMAL ML/MIN/{1.73_M2}
GLUCOSE BLD-MCNC: 93 MG/DL (ref 70–99)
POTASSIUM SERPL-SCNC: 4.8 MMOL/L (ref 3.7–5.3)
SODIUM BLD-SCNC: 138 MMOL/L (ref 135–144)

## 2018-03-26 PROCEDURE — 36415 COLL VENOUS BLD VENIPUNCTURE: CPT

## 2018-03-26 PROCEDURE — 80048 BASIC METABOLIC PNL TOTAL CA: CPT

## 2018-03-26 RX ORDER — METAXALONE 800 MG/1
TABLET ORAL
Qty: 30 TABLET | Refills: 5 | Status: SHIPPED | OUTPATIENT
Start: 2018-03-26

## 2018-04-04 ENCOUNTER — PATIENT MESSAGE (OUTPATIENT)
Dept: FAMILY MEDICINE CLINIC | Facility: CLINIC | Age: 53
End: 2018-04-04

## 2018-04-05 ENCOUNTER — PATIENT MESSAGE (OUTPATIENT)
Dept: FAMILY MEDICINE CLINIC | Facility: CLINIC | Age: 53
End: 2018-04-05

## 2019-12-08 ENCOUNTER — APPOINTMENT (OUTPATIENT)
Dept: GENERAL RADIOLOGY | Age: 54
End: 2019-12-08
Payer: OTHER GOVERNMENT

## 2019-12-08 ENCOUNTER — HOSPITAL ENCOUNTER (EMERGENCY)
Age: 54
Discharge: HOME | End: 2019-12-08
Attending: SPECIALIST
Payer: OTHER GOVERNMENT

## 2019-12-08 VITALS
RESPIRATION RATE: 16 BRPM | DIASTOLIC BLOOD PRESSURE: 108 MMHG | SYSTOLIC BLOOD PRESSURE: 181 MMHG | HEART RATE: 84 BPM | OXYGEN SATURATION: 98 % | TEMPERATURE: 98.4 F

## 2019-12-08 DIAGNOSIS — S93.601A SPRAIN OF RIGHT FOOT, INITIAL ENCOUNTER: Primary | ICD-10-CM

## 2019-12-08 PROCEDURE — 99283 EMERGENCY DEPT VISIT LOW MDM: CPT

## 2019-12-08 PROCEDURE — 73630 X-RAY EXAM OF FOOT: CPT

## 2019-12-08 ASSESSMENT — PAIN DESCRIPTION - ORIENTATION: ORIENTATION: RIGHT

## 2019-12-08 ASSESSMENT — PAIN SCALES - GENERAL: PAINLEVEL_OUTOF10: 4

## 2019-12-08 ASSESSMENT — PAIN DESCRIPTION - PAIN TYPE: TYPE: ACUTE PAIN

## 2019-12-08 ASSESSMENT — PAIN DESCRIPTION - LOCATION: LOCATION: FOOT

## 2025-05-30 ENCOUNTER — TELEPHONE (OUTPATIENT)
Dept: NEUROSURGERY | Age: 60
End: 2025-05-30

## 2025-05-30 NOTE — TELEPHONE ENCOUNTER
(Staff Message Cee Mike, DO)  Called on 5/30 to scheduled with Dr. Mike either a VV today 5/30 or in clinic on Thursday 6/5   Just saw referral from [facility] for spinal cord tumor  Please add her to be schedule today  Need her imaging from [facility] sent (several mri this year)  If she can't, lets add her to my Thursday schedule

## 2025-06-05 ENCOUNTER — OFFICE VISIT (OUTPATIENT)
Dept: NEUROSURGERY | Age: 60
End: 2025-06-05
Payer: OTHER GOVERNMENT

## 2025-06-05 VITALS
DIASTOLIC BLOOD PRESSURE: 87 MMHG | HEART RATE: 79 BPM | WEIGHT: 293 LBS | BODY MASS INDEX: 44.41 KG/M2 | SYSTOLIC BLOOD PRESSURE: 142 MMHG | HEIGHT: 68 IN

## 2025-06-05 DIAGNOSIS — C72.0 MALIGNANT NEOPLASM OF INTRAMEDULLARY SPINAL CORD (HCC): Primary | ICD-10-CM

## 2025-06-05 PROCEDURE — 99204 OFFICE O/P NEW MOD 45 MIN: CPT | Performed by: NEUROLOGICAL SURGERY

## 2025-06-05 RX ORDER — BUMETANIDE 1 MG/1
1 TABLET ORAL 2 TIMES DAILY
COMMUNITY
Start: 2025-03-24 | End: 2026-03-19

## 2025-06-05 RX ORDER — MONTELUKAST SODIUM 10 MG/1
10 TABLET ORAL NIGHTLY
COMMUNITY

## 2025-06-05 RX ORDER — SEMAGLUTIDE 0.5 MG/.5ML
0.5 INJECTION, SOLUTION SUBCUTANEOUS
COMMUNITY
Start: 2025-04-22

## 2025-06-05 RX ORDER — PREGABALIN 25 MG/1
25 CAPSULE ORAL 2 TIMES DAILY
COMMUNITY
Start: 2025-06-02

## 2025-06-05 RX ORDER — ASCORBIC ACID 500 MG
1000 TABLET ORAL DAILY
COMMUNITY

## 2025-06-05 RX ORDER — DEXAMETHASONE 4 MG/1
2 TABLET ORAL
COMMUNITY
Start: 2025-04-09

## 2025-06-05 RX ORDER — EMPAGLIFLOZIN 10 MG/1
10 TABLET, FILM COATED ORAL EVERY MORNING
COMMUNITY

## 2025-06-05 RX ORDER — PSYLLIUM HUSK 0.4 G
2000 CAPSULE ORAL DAILY
COMMUNITY

## 2025-06-05 RX ORDER — SPIRONOLACTONE 25 MG/1
12.5 TABLET ORAL DAILY
COMMUNITY
Start: 2025-04-22 | End: 2025-10-19

## 2025-06-05 NOTE — PROGRESS NOTES
Electronically signed: Julian Deleon,   6/5/2025    Joel Mike DO  Staff Neurosurgeon    This note was created using voice recognition software. There may be inaccuracies of transcription  that are inadvertently overlooked prior to the signature.  There is any questions about the transcription please contact me.

## 2025-07-11 ENCOUNTER — TELEPHONE (OUTPATIENT)
Dept: NEUROSURGERY | Age: 60
End: 2025-07-11

## 2025-07-17 ENCOUNTER — OFFICE VISIT (OUTPATIENT)
Dept: NEUROSURGERY | Age: 60
End: 2025-07-17
Payer: OTHER GOVERNMENT

## 2025-07-17 VITALS
DIASTOLIC BLOOD PRESSURE: 74 MMHG | HEART RATE: 76 BPM | BODY MASS INDEX: 44.41 KG/M2 | WEIGHT: 293 LBS | HEIGHT: 68 IN | SYSTOLIC BLOOD PRESSURE: 110 MMHG

## 2025-07-17 DIAGNOSIS — C72.0 MALIGNANT NEOPLASM OF INTRAMEDULLARY SPINAL CORD (HCC): Primary | ICD-10-CM

## 2025-07-17 PROCEDURE — 99214 OFFICE O/P EST MOD 30 MIN: CPT | Performed by: NEUROLOGICAL SURGERY

## 2025-07-17 RX ORDER — MULTIVIT WITH MINERALS/LUTEIN
400 TABLET ORAL 2 TIMES DAILY
Qty: 180 CAPSULE | Refills: 1 | Status: SHIPPED | OUTPATIENT
Start: 2025-07-17

## 2025-07-17 RX ORDER — FLUTICASONE FUROATE, UMECLIDINIUM BROMIDE AND VILANTEROL TRIFENATATE 100; 62.5; 25 UG/1; UG/1; UG/1
POWDER RESPIRATORY (INHALATION)
COMMUNITY

## 2025-07-17 RX ORDER — DEXAMETHASONE 2 MG/1
TABLET ORAL
Qty: 120 TABLET | Refills: 0 | Status: SHIPPED | OUTPATIENT
Start: 2025-07-17

## 2025-07-17 RX ORDER — METOPROLOL SUCCINATE 25 MG/1
12.5 TABLET, EXTENDED RELEASE ORAL DAILY
COMMUNITY
Start: 2025-06-26 | End: 2025-09-29

## 2025-07-17 RX ORDER — PROCHLORPERAZINE MALEATE 10 MG
TABLET ORAL
COMMUNITY
Start: 2025-05-30

## 2025-07-17 RX ORDER — AMIODARONE HYDROCHLORIDE 200 MG/1
200 TABLET ORAL 2 TIMES DAILY
COMMUNITY
Start: 2025-07-09

## 2025-07-17 NOTE — PROGRESS NOTES
Department of Neurosurgery                                                      Follow up visit      History Obtained From:  patient, spouse    CHIEF COMPLAINT:         Chief Complaint   Patient presents with    Follow-up     Malignant neoplasm of intramedullary spinal cord   MRI completed at [facility]          HISTORY OF PRESENT ILLNESS:       The patient is a 59 y.o. female who presents for follow up for metastatic intramedullary tumor.  Reports improvement gait and function. She is getting PT. she denies neck pain      PAST MEDICAL HISTORY :       Past Medical History:        Diagnosis Date    Abnormal menses     Varicose veins        Past Surgical History:        Procedure Laterality Date    APPENDECTOMY  1967    BREAST LUMPECTOMY Left 1977    CHOLECYSTECTOMY  1997    HYSTERECTOMY (CERVIX STATUS UNKNOWN)  7-20-15    Dr. Livingston, [place]    TONSILLECTOMY AND ADENOIDECTOMY Bilateral 1969       Social History:   Social History     Socioeconomic History    Marital status:      Spouse name: Not on file    Number of children: Not on file    Years of education: Not on file    Highest education level: Not on file   Occupational History    Not on file   Tobacco Use    Smoking status: Every Day     Current packs/day: 0.50     Average packs/day: 0.5 packs/day for 30.0 years (15.0 ttl pk-yrs)     Types: Cigarettes    Smokeless tobacco: Never   Substance and Sexual Activity    Alcohol use: No     Alcohol/week: 0.0 standard drinks of alcohol    Drug use: No    Sexual activity: Yes   Other Topics Concern    Not on file   Social History Narrative    Not on file     Social Drivers of Health     Financial Resource Strain: Low Risk  (7/15/2025)    Received from [facility]    Overall Financial Resource Strain (CARDIA)     Difficulty of Paying Living Expenses: Not hard at all   Food Insecurity: No Food Insecurity (7/15/2025)    Received from [facility]    Hunger Vital Sign     Within the